# Patient Record
Sex: FEMALE | Race: OTHER | Employment: FULL TIME | URBAN - METROPOLITAN AREA
[De-identification: names, ages, dates, MRNs, and addresses within clinical notes are randomized per-mention and may not be internally consistent; named-entity substitution may affect disease eponyms.]

---

## 2017-01-17 ENCOUNTER — OFFICE VISIT (OUTPATIENT)
Dept: GASTROENTEROLOGY | Facility: CLINIC | Age: 52
End: 2017-01-17

## 2017-01-17 ENCOUNTER — TELEPHONE (OUTPATIENT)
Dept: GASTROENTEROLOGY | Facility: CLINIC | Age: 52
End: 2017-01-17

## 2017-01-17 VITALS
HEART RATE: 98 BPM | SYSTOLIC BLOOD PRESSURE: 106 MMHG | BODY MASS INDEX: 27.5 KG/M2 | HEIGHT: 61 IN | DIASTOLIC BLOOD PRESSURE: 69 MMHG | WEIGHT: 145.63 LBS

## 2017-01-17 DIAGNOSIS — Z12.12 ENCOUNTER FOR COLORECTAL CANCER SCREENING: Primary | ICD-10-CM

## 2017-01-17 DIAGNOSIS — Z12.11 ENCOUNTER FOR COLORECTAL CANCER SCREENING: Primary | ICD-10-CM

## 2017-01-17 PROCEDURE — 99243 OFF/OP CNSLTJ NEW/EST LOW 30: CPT | Performed by: INTERNAL MEDICINE

## 2017-01-17 PROCEDURE — 99212 OFFICE O/P EST SF 10 MIN: CPT | Performed by: INTERNAL MEDICINE

## 2017-01-17 NOTE — TELEPHONE ENCOUNTER
Scheduled for:  Colonoscopy 64148  Date:  2/9/17  Location:  [facility]  Sedation:  IV  Time:  0830  Prep:  Miaralax/Gatorade  Meds/Allergies Reconciled?:  Yes  Diagnosis with codes:  Colon cancer screening Z12.11  EM or [facility] notified?:  I notified Etienne Lentz in Endo

## 2017-01-17 NOTE — H&P
History of present illness: This is a delightful 42-year-old female referred by Dr. Meseret Grijalva for a colorectal cancer screening evaluation. The patient has never undergone colonoscopy or other forms of screening.   She denies symptoms including rectal b

## 2017-01-17 NOTE — PROGRESS NOTES
HPI:    Patient ID: Nav Aggarwal is a 49year old female. HPI    Review of Systems   Constitutional: Negative for fever, chills, diaphoresis, activity change, appetite change, fatigue and unexpected weight change.    HENT: Negative for congest There is no CVA tenderness. No hernia. Hernia confirmed negative in the ventral area. Abdomen soft, nondistended, nontender. No masses or hepatosplenomegaly. Lymphadenopathy:     She has no cervical adenopathy. Skin: She is not diaphoretic.    Psychi

## 2017-02-09 ENCOUNTER — HOSPITAL ENCOUNTER (OUTPATIENT)
Facility: HOSPITAL | Age: 52
Setting detail: HOSPITAL OUTPATIENT SURGERY
Discharge: HOME | End: 2017-02-09
Attending: INTERNAL MEDICINE | Admitting: INTERNAL MEDICINE
Payer: COMMERCIAL

## 2017-02-09 ENCOUNTER — SURGERY (OUTPATIENT)
Age: 52
End: 2017-02-09

## 2017-02-09 PROBLEM — K64.8 INTERNAL HEMORRHOIDS: Status: ACTIVE | Noted: 2017-02-09

## 2017-02-09 PROCEDURE — 0DJD8ZZ INSPECTION OF LOWER INTESTINAL TRACT, VIA NATURAL OR ARTIFICIAL OPENING ENDOSCOPIC: ICD-10-PCS | Performed by: INTERNAL MEDICINE

## 2017-02-09 PROCEDURE — 45378 DIAGNOSTIC COLONOSCOPY: CPT | Performed by: INTERNAL MEDICINE

## 2017-02-09 PROCEDURE — 99152 MOD SED SAME PHYS/QHP 5/>YRS: CPT | Performed by: INTERNAL MEDICINE

## 2017-02-09 RX ORDER — SODIUM CHLORIDE 0.9 % (FLUSH) 0.9 %
10 SYRINGE (ML) INJECTION AS NEEDED
Status: DISCONTINUED | OUTPATIENT
Start: 2017-02-09 | End: 2017-02-09

## 2017-02-09 RX ORDER — MIDAZOLAM HYDROCHLORIDE 1 MG/ML
INJECTION INTRAMUSCULAR; INTRAVENOUS
Status: DISCONTINUED | OUTPATIENT
Start: 2017-02-09 | End: 2017-02-09

## 2017-02-09 RX ORDER — MIDAZOLAM HYDROCHLORIDE 1 MG/ML
1 INJECTION INTRAMUSCULAR; INTRAVENOUS EVERY 5 MIN PRN
Status: DISCONTINUED | OUTPATIENT
Start: 2017-02-09 | End: 2017-02-09

## 2017-02-09 RX ORDER — SODIUM CHLORIDE, SODIUM LACTATE, POTASSIUM CHLORIDE, CALCIUM CHLORIDE 600; 310; 30; 20 MG/100ML; MG/100ML; MG/100ML; MG/100ML
INJECTION, SOLUTION INTRAVENOUS CONTINUOUS
Status: DISCONTINUED | OUTPATIENT
Start: 2017-02-09 | End: 2017-02-09

## 2017-02-09 NOTE — H&P
History & Physical Examination    Patient Name: Ace Kaye  MRN: [id number]  [facility]: [id number]  YOB: 1965    Diagnosis: colorectal screening      No prescriptions prior to admission    Current Facility-Administered Medications:  Mid

## 2017-02-09 NOTE — BRIEF OP NOTE
[facility] ENDOSCOPY LAB SUITES  Brief Op Note     Bessie Holt Location: OR   [facility] [id number] MRN [id number]   Admission Date 2/9/2017 Operation Date 2/9/2017   Attending Physician Modesta Choe* Operating Physician Akiko Diss

## 2017-02-09 NOTE — OPERATIVE REPORT
[facility]    PATIENT'S NAME: Christiansen Conrad   ATTENDING PHYSICIAN: Herlinda Jerome MD   OPERATING PHYSICIAN: Herlinda Jerome MD   PATIENT ACCOUNT#:   [de-identified]    LOCATION:  [facility] ENDO [place] ROOM [address] [place] [address] tags.    RECOMMENDATION:  Next screening colonoscopy in 10 years unless other signs or symptoms develop in the interim.     Dictated By Bonnie Mcintosh MD  d: 02/09/2017 93:36:06  t: 02/09/2017 10:14:24  [facility] [id number]  [facility]/

## 2017-02-10 VITALS
RESPIRATION RATE: 14 BRPM | HEIGHT: 61 IN | DIASTOLIC BLOOD PRESSURE: 54 MMHG | HEART RATE: 76 BPM | BODY MASS INDEX: 25.86 KG/M2 | SYSTOLIC BLOOD PRESSURE: 100 MMHG | WEIGHT: 137 LBS | OXYGEN SATURATION: 99 %

## 2017-02-13 ENCOUNTER — TELEPHONE (OUTPATIENT)
Dept: GASTROENTEROLOGY | Facility: CLINIC | Age: 52
End: 2017-02-13

## 2021-10-06 ENCOUNTER — LAB ENCOUNTER (OUTPATIENT)
Dept: LAB | Age: 56
End: 2021-10-06
Attending: INTERNAL MEDICINE
Payer: COMMERCIAL

## 2021-10-06 ENCOUNTER — OFFICE VISIT (OUTPATIENT)
Dept: INTERNAL MEDICINE CLINIC | Facility: CLINIC | Age: 56
End: 2021-10-06
Payer: COMMERCIAL

## 2021-10-06 ENCOUNTER — TELEPHONE (OUTPATIENT)
Dept: INTERNAL MEDICINE CLINIC | Facility: CLINIC | Age: 56
End: 2021-10-06

## 2021-10-06 VITALS
HEART RATE: 80 BPM | WEIGHT: 144 LBS | SYSTOLIC BLOOD PRESSURE: 114 MMHG | BODY MASS INDEX: 27.19 KG/M2 | TEMPERATURE: 98 F | HEIGHT: 61 IN | DIASTOLIC BLOOD PRESSURE: 72 MMHG

## 2021-10-06 DIAGNOSIS — E11.9 TYPE 2 DIABETES MELLITUS WITHOUT COMPLICATION, WITHOUT LONG-TERM CURRENT USE OF INSULIN (HCC): ICD-10-CM

## 2021-10-06 DIAGNOSIS — Z76.89 ENCOUNTER TO ESTABLISH CARE: Primary | ICD-10-CM

## 2021-10-06 DIAGNOSIS — Z00.00 ANNUAL PHYSICAL EXAM: ICD-10-CM

## 2021-10-06 DIAGNOSIS — E78.00 HYPERCHOLESTEROLEMIA: ICD-10-CM

## 2021-10-06 DIAGNOSIS — R94.31 ABNORMAL EKG: ICD-10-CM

## 2021-10-06 DIAGNOSIS — Z01.812 PRE-PROCEDURE LAB EXAM: ICD-10-CM

## 2021-10-06 PROCEDURE — 3074F SYST BP LT 130 MM HG: CPT | Performed by: INTERNAL MEDICINE

## 2021-10-06 PROCEDURE — 80053 COMPREHEN METABOLIC PANEL: CPT

## 2021-10-06 PROCEDURE — 99386 PREV VISIT NEW AGE 40-64: CPT | Performed by: INTERNAL MEDICINE

## 2021-10-06 PROCEDURE — 82043 UR ALBUMIN QUANTITATIVE: CPT

## 2021-10-06 PROCEDURE — 93000 ELECTROCARDIOGRAM COMPLETE: CPT | Performed by: INTERNAL MEDICINE

## 2021-10-06 PROCEDURE — 3008F BODY MASS INDEX DOCD: CPT | Performed by: INTERNAL MEDICINE

## 2021-10-06 PROCEDURE — 3078F DIAST BP <80 MM HG: CPT | Performed by: INTERNAL MEDICINE

## 2021-10-06 PROCEDURE — 83036 HEMOGLOBIN GLYCOSYLATED A1C: CPT

## 2021-10-06 PROCEDURE — 85025 COMPLETE CBC W/AUTO DIFF WBC: CPT

## 2021-10-06 PROCEDURE — 36415 COLL VENOUS BLD VENIPUNCTURE: CPT

## 2021-10-06 PROCEDURE — 82570 ASSAY OF URINE CREATININE: CPT

## 2021-10-06 RX ORDER — ASPIRIN 81 MG/1
TABLET ORAL
COMMUNITY
End: 2021-10-06

## 2021-10-06 RX ORDER — ATORVASTATIN CALCIUM 10 MG/1
10 TABLET, FILM COATED ORAL DAILY
COMMUNITY
Start: 2021-07-08

## 2021-10-06 RX ORDER — ASPIRIN 81 MG/1
81 TABLET ORAL DAILY
Refills: 0 | COMMUNITY
Start: 2021-10-06

## 2021-10-06 NOTE — PATIENT INSTRUCTIONS
Mammogram due after 11/10/21. Shingles vaccine is available at pharmacy. Get a flu shot at your work. CT heart calcium score screening test.   Stress echo recommended for abnormal EKG.

## 2021-10-06 NOTE — PROGRESS NOTES
Lola Hubbard is a 48year old female who presents for     Reestablish care–last seen 10/28/16. In the interim saw a doctor at [address] [facility]--Dr Phoebe Angel. Decided to come back here. Annual physical.    Feels good.      Since last seen here, diagn constipation  Genitourinary:  No dysuria or blood in the urine  Dermatologic:  No rash or itching.       EXAM:   /72   Pulse 80   Temp 98.1 °F (36.7 °C) (Oral)   Ht 5' 1\" (1.549 m)   Wt 144 lb (65.3 kg)   BMI 27.21 kg/m²     Wt Readings from Last 6 E given at 10/6/21 visit to do at [facility] HOSP AT [facility]. Gyn-pap w HPV neg 11/17/16--Dr Maria Eugenia Cruz. Also per pt had pap June 2020 at PCP Dr Ti Cohen. Colonoscopy 2/9/17–Dr De Los Santos-normal, small int.hemorrhoids.   At 10/6/21 visit, I recom CT heart calcium score screening test.

## 2021-10-06 NOTE — TELEPHONE ENCOUNTER
To nursing, please tell patient personally   lab results done today are good. A1c is 6.4 which indicates overall good sugar control. Please also tell her I read her EKG after she left the office today.   There are some abnormalities that may or may not

## 2022-02-26 ENCOUNTER — LAB ENCOUNTER (OUTPATIENT)
Dept: LAB | Age: 57
End: 2022-02-26
Attending: INTERNAL MEDICINE
Payer: COMMERCIAL

## 2022-02-26 DIAGNOSIS — Z01.812 PRE-PROCEDURE LAB EXAM: ICD-10-CM

## 2022-02-27 LAB — SARS-COV-2 RNA RESP QL NAA+PROBE: NOT DETECTED

## 2022-02-28 ENCOUNTER — TELEPHONE (OUTPATIENT)
Dept: INTERNAL MEDICINE CLINIC | Facility: CLINIC | Age: 57
End: 2022-02-28

## 2022-03-01 ENCOUNTER — TELEPHONE (OUTPATIENT)
Dept: INTERNAL MEDICINE CLINIC | Facility: CLINIC | Age: 57
End: 2022-03-01

## 2022-03-01 NOTE — TELEPHONE ENCOUNTER
I called and spoke to [address] [place] [address] Diagnostics--Jaelyn. Patient exercised for about 2 minutes on the treadmill for stress echo today and when heart rate 110, she changed to left bundle branch block. Stress echo was stopped. Order changed to [facility] stress test as recommended. Discussed insurance will need to approve. 1. LBBB (left bundle branch block)  - CARD NUC STRESS TEST LEXISCAN (CPT=93015/20270/); Future    2. Abnormal EKG  - CARD NUC STRESS TEST LEXISCAN (CPT=93015/21548/);  Future

## 2022-03-01 NOTE — TELEPHONE ENCOUNTER
To nursing, please tell patient I spoke to [place] at [facility] [place] [facility] cardiac diagnostics and she said due to her heart rhythm pattern today, we needed to change the type of stress test to a resting stress test called Lexiscan stress test.  Tell her I entered this order. She should check with her insurance to be sure they authorize this. Thanks.

## 2022-03-01 NOTE — TELEPHONE ENCOUNTER
Ron Collins from [facility] is calling and requesting orders put in for a     [address] [place] [address]    Patient has a left bundle branch block  (LBB)

## 2022-03-10 ENCOUNTER — HOSPITAL ENCOUNTER (OUTPATIENT)
Dept: NUCLEAR MEDICINE | Facility: HOSPITAL | Age: 57
Discharge: HOME | End: 2022-03-10
Attending: INTERNAL MEDICINE
Payer: COMMERCIAL

## 2022-03-10 ENCOUNTER — HOSPITAL ENCOUNTER (OUTPATIENT)
Dept: CV DIAGNOSTICS | Facility: HOSPITAL | Age: 57
Discharge: HOME | End: 2022-03-10
Attending: INTERNAL MEDICINE
Payer: COMMERCIAL

## 2022-03-10 ENCOUNTER — TELEPHONE (OUTPATIENT)
Dept: INTERNAL MEDICINE CLINIC | Facility: CLINIC | Age: 57
End: 2022-03-10

## 2022-03-10 DIAGNOSIS — R94.31 ABNORMAL EKG: ICD-10-CM

## 2022-03-10 DIAGNOSIS — I44.7 LBBB (LEFT BUNDLE BRANCH BLOCK): ICD-10-CM

## 2022-03-10 PROCEDURE — 78452 HT MUSCLE IMAGE SPECT MULT: CPT | Performed by: INTERNAL MEDICINE

## 2022-03-10 PROCEDURE — 93018 CV STRESS TEST I&R ONLY: CPT | Performed by: INTERNAL MEDICINE

## 2022-03-10 PROCEDURE — 93017 CV STRESS TEST TRACING ONLY: CPT | Performed by: INTERNAL MEDICINE

## 2022-03-10 PROCEDURE — 93016 CV STRESS TEST SUPVJ ONLY: CPT | Performed by: INTERNAL MEDICINE

## 2022-03-10 NOTE — TELEPHONE ENCOUNTER
called regarding nuclear stress test results results for the patient. Per , it shows \"small, moderately reversible perfusion defect which may constitute evidence of stress induced ischemia\". Report in Epic.  To  (on call) as well as Arin Farrell

## 2022-03-10 NOTE — TELEPHONE ENCOUNTER
LMTCB on cell phone   (LM I'm not in the office this week but call for test results from an office staff member.)  When pt calls back, tell her the stress test today is abnormal and she needs to see a cardiologist. Does she have one in mind? If not, will need to expedite cardiology appt for her. Dr Lopez Police, etc.  If any symptoms such as chest pain or shortness of breath, she needs to go to ER. Thanks.

## 2022-03-11 NOTE — TELEPHONE ENCOUNTER
Called patient  and relayed DR. ARIAS message - verbalized understanding Information for DR. Yael Montalvo given to patient

## 2022-03-13 NOTE — TELEPHONE ENCOUNTER
To nursing, please call pt--tell her Dr Juice Tate told me she wants a female cardiologist.  Does she have one in mind that she wants to see? She should be aware there is no female cardiologist at [facility] and the one at [facility]. [facility] Gisela and [employer] do not do angiograms. Would like to expedite an appt due to concerning stress test results. Please let me know. Thanks. Note to self-  I spoke to Dr Juice Tate on Friday 3/11/22. He would arrange an expedited appt for pt.    He contacted me later and said pt wanted instead a female cardiologist.

## 2022-03-14 NOTE — TELEPHONE ENCOUNTER
Ollie Collet called from [facility] Cardiovascular  They can squeeze patient in if needed  Dr Diogo Rangel can double book   (Pt might be looking for female cardiologist  GERRI Vasquez Pt can try Dr Sadaf Badillo, she is female, doesn't do cath.   [phone])  Please call back to schedule with Dr Diogo Rangel if patient would like   Tasked to nursing

## 2022-03-14 NOTE — TELEPHONE ENCOUNTER
Called and relayed MDs message-verbalized understanding. Patient is going to try call [facility] to determine if they have a female cardiologist that does angiograms. She will call back with an update. Advised to call back with full name, address, phone & fax # for documentation purposes.    Patient made aware that MD would like process expedite due to result of stress test.

## 2022-03-14 NOTE — TELEPHONE ENCOUNTER
Left message for Soren Munguia at Dr Otilio Abel office. VM left for Soren Munguia to proceed with scheduling patient with Dr. Severo Bores ASAP. (since Dr. Allie Perkins doesn't do cath). Advised to call patient to schedule. Instructed to call our office back if she has any questions.

## 2022-03-14 NOTE — TELEPHONE ENCOUNTER
LMTCB on backline for [address] [place] [address] Avenue to verify MD message below if they were reaching out to pt to get her in expedited with Dr. Marilee Stock.

## 2022-03-14 NOTE — TELEPHONE ENCOUNTER
To nursing, pls call Dr Fredi Champion office and tell them I spoke to him Friday 3/11/22 and he said his office could contact pt to expedite an appt for pt. Can they pls reach out to pt and let Dr Diogo Rangel know? Thanks.

## 2022-03-14 NOTE — TELEPHONE ENCOUNTER
Patient called back and stated that she will see Dr. Anahy Cristobal. Contact information provided. Advised to call and schedule appt asap.

## 2022-03-24 VITALS — HEIGHT: 62 IN | BODY MASS INDEX: 26.5 KG/M2 | WEIGHT: 144 LBS

## 2022-03-24 RX ORDER — SODIUM CHLORIDE 9 MG/ML
INJECTION, SOLUTION INTRAVENOUS
OUTPATIENT
Start: 2022-03-25 | End: 2022-03-25

## 2022-03-26 ENCOUNTER — NURSE ONLY (OUTPATIENT)
Dept: LAB | Facility: HOSPITAL | Age: 57
End: 2022-03-26
Attending: INTERNAL MEDICINE
Payer: COMMERCIAL

## 2022-03-26 DIAGNOSIS — E11.9 TYPE 2 DIABETES MELLITUS WITHOUT COMPLICATION, WITHOUT LONG-TERM CURRENT USE OF INSULIN (HCC): ICD-10-CM

## 2022-03-26 DIAGNOSIS — Z01.818 PRE-OP TESTING: ICD-10-CM

## 2022-03-26 LAB
ALBUMIN SERPL-MCNC: 3.5 G/DL (ref 3.4–5)
ALBUMIN/GLOB SERPL: 1 {RATIO} (ref 1–2)
ALP LIVER SERPL-CCNC: 102 U/L
ALT SERPL-CCNC: 20 U/L
ANION GAP SERPL CALC-SCNC: 5 MMOL/L (ref 0–18)
AST SERPL-CCNC: 16 U/L (ref 15–37)
BILIRUB SERPL-MCNC: 0.5 MG/DL (ref 0.1–2)
BUN BLD-MCNC: 10 MG/DL (ref 7–18)
BUN/CREAT SERPL: 13.7 (ref 10–20)
CALCIUM BLD-MCNC: 9 MG/DL (ref 8.5–10.1)
CHLORIDE SERPL-SCNC: 107 MMOL/L (ref 98–112)
CHOLEST SERPL-MCNC: 137 MG/DL (ref ?–200)
CO2 SERPL-SCNC: 28 MMOL/L (ref 21–32)
CREAT BLD-MCNC: 0.73 MG/DL
DEPRECATED RDW RBC AUTO: 38 FL (ref 35.1–46.3)
ERYTHROCYTE [DISTWIDTH] IN BLOOD BY AUTOMATED COUNT: 12.5 % (ref 11–15)
EST. AVERAGE GLUCOSE BLD GHB EST-MCNC: 134 MG/DL (ref 68–126)
FASTING PATIENT LIPID ANSWER: YES
FASTING STATUS PATIENT QL REPORTED: YES
GLOBULIN PLAS-MCNC: 3.6 G/DL (ref 2.8–4.4)
GLUCOSE BLD-MCNC: 147 MG/DL (ref 70–99)
HBA1C MFR BLD: 6.3 % (ref ?–5.7)
HCT VFR BLD AUTO: 37.7 %
HDLC SERPL-MCNC: 46 MG/DL (ref 40–59)
HGB BLD-MCNC: 12.9 G/DL
INR BLD: 1.04 (ref 0.8–1.2)
LDLC SERPL CALC-MCNC: 73 MG/DL (ref ?–100)
MCH RBC QN AUTO: 29.1 PG (ref 26–34)
MCHC RBC AUTO-ENTMCNC: 34.2 G/DL (ref 31–37)
MCV RBC AUTO: 84.9 FL
NONHDLC SERPL-MCNC: 91 MG/DL (ref ?–130)
OSMOLALITY SERPL CALC.SUM OF ELEC: 292 MOSM/KG (ref 275–295)
PLATELET # BLD AUTO: 275 10(3)UL (ref 150–450)
POTASSIUM SERPL-SCNC: 4.1 MMOL/L (ref 3.5–5.1)
PROT SERPL-MCNC: 7.1 G/DL (ref 6.4–8.2)
PROTHROMBIN TIME: 13.7 SECONDS (ref 11.6–14.8)
RBC # BLD AUTO: 4.44 X10(6)UL
SODIUM SERPL-SCNC: 140 MMOL/L (ref 136–145)
TRIGL SERPL-MCNC: 94 MG/DL (ref 30–149)
TSI SER-ACNC: 2.93 MIU/ML (ref 0.36–3.74)
VLDLC SERPL CALC-MCNC: 14 MG/DL (ref 0–30)
WBC # BLD AUTO: 9.3 X10(3) UL (ref 4–11)

## 2022-03-26 PROCEDURE — 85610 PROTHROMBIN TIME: CPT

## 2022-03-26 PROCEDURE — 84443 ASSAY THYROID STIM HORMONE: CPT

## 2022-03-26 PROCEDURE — 80053 COMPREHEN METABOLIC PANEL: CPT

## 2022-03-26 PROCEDURE — 83036 HEMOGLOBIN GLYCOSYLATED A1C: CPT

## 2022-03-26 PROCEDURE — 3044F HG A1C LEVEL LT 7.0%: CPT | Performed by: INTERNAL MEDICINE

## 2022-03-26 PROCEDURE — 85027 COMPLETE CBC AUTOMATED: CPT

## 2022-03-26 PROCEDURE — 36415 COLL VENOUS BLD VENIPUNCTURE: CPT

## 2022-03-26 PROCEDURE — 80061 LIPID PANEL: CPT

## 2022-03-28 ENCOUNTER — TELEPHONE (OUTPATIENT)
Dept: INTERNAL MEDICINE CLINIC | Facility: CLINIC | Age: 57
End: 2022-03-28

## 2022-03-29 ENCOUNTER — HOSPITAL ENCOUNTER (OUTPATIENT)
Dept: INTERVENTIONAL RADIOLOGY/VASCULAR | Facility: HOSPITAL | Age: 57
Discharge: HOME | End: 2022-03-29
Attending: INTERNAL MEDICINE
Payer: COMMERCIAL

## 2022-03-29 DIAGNOSIS — Z01.818 PRE-OP TESTING: Primary | ICD-10-CM

## 2022-04-05 ENCOUNTER — OFFICE VISIT (OUTPATIENT)
Dept: INTERNAL MEDICINE CLINIC | Facility: CLINIC | Age: 57
End: 2022-04-05
Payer: COMMERCIAL

## 2022-04-05 VITALS
RESPIRATION RATE: 12 BRPM | DIASTOLIC BLOOD PRESSURE: 68 MMHG | SYSTOLIC BLOOD PRESSURE: 112 MMHG | WEIGHT: 140 LBS | BODY MASS INDEX: 25.76 KG/M2 | TEMPERATURE: 98 F | HEIGHT: 62 IN | OXYGEN SATURATION: 98 % | HEART RATE: 71 BPM

## 2022-04-05 DIAGNOSIS — R94.39 ABNORMAL STRESS TEST: ICD-10-CM

## 2022-04-05 DIAGNOSIS — E78.00 HYPERCHOLESTEROLEMIA: ICD-10-CM

## 2022-04-05 DIAGNOSIS — E11.9 TYPE 2 DIABETES MELLITUS WITHOUT COMPLICATION, WITHOUT LONG-TERM CURRENT USE OF INSULIN (HCC): Primary | ICD-10-CM

## 2022-04-05 PROCEDURE — 3074F SYST BP LT 130 MM HG: CPT | Performed by: INTERNAL MEDICINE

## 2022-04-05 PROCEDURE — 3008F BODY MASS INDEX DOCD: CPT | Performed by: INTERNAL MEDICINE

## 2022-04-05 PROCEDURE — 3078F DIAST BP <80 MM HG: CPT | Performed by: INTERNAL MEDICINE

## 2022-04-05 PROCEDURE — 99214 OFFICE O/P EST MOD 30 MIN: CPT | Performed by: INTERNAL MEDICINE

## 2022-04-05 RX ORDER — ATORVASTATIN CALCIUM 10 MG/1
10 TABLET, FILM COATED ORAL DAILY
Qty: 90 TABLET | Refills: 3 | Status: SHIPPED | OUTPATIENT
Start: 2022-04-05

## 2022-04-05 RX ORDER — ASPIRIN 81 MG/1
81 TABLET ORAL DAILY
Qty: 90 TABLET | Refills: 3 | Status: SHIPPED | OUTPATIENT
Start: 2022-04-05

## 2022-04-12 ENCOUNTER — TELEPHONE (OUTPATIENT)
Dept: INTERNAL MEDICINE CLINIC | Facility: CLINIC | Age: 57
End: 2022-04-12

## 2022-04-12 NOTE — TELEPHONE ENCOUNTER
Nursing,     Patient confirmed she is taking 50mg of Januvia. Did confirm on , initially dispensed in error on 4/6/22 at 25mg. Pended RX for Nursing to address (please note, out of MA scope of practice).

## 2022-04-12 NOTE — TELEPHONE ENCOUNTER
Patient calling verifying correct dosage of medication. Was taking Sitagliptin (Januvia) 50mg taking 1 tablet a day. Noticed on her AVS Sitagliptin (Januvia) 25mg. Asking if Dr. Marcia Auguste has changed the dosage. Will wait to  prescription.       Best call back number [phone]

## 2022-04-12 NOTE — TELEPHONE ENCOUNTER
Spoke to patient and confirmed she does need refill and that she is taking 50 mg januvia, [facility]

## 2022-04-12 NOTE — TELEPHONE ENCOUNTER
To nursing. When she returned to this practice (re-established care) in Oct 2021, we entered dose of januvia 25 mg daily as the dose she was on. Perhaps this dose was entered in error and she actually was taking 50 mg daily. If that is the case, then change in med list dose to januvia 50 mg daily and can send new refill #90 with 3 RFs if needed. Thanks.

## 2023-04-10 ENCOUNTER — TELEPHONE (OUTPATIENT)
Dept: INTERNAL MEDICINE CLINIC | Facility: CLINIC | Age: 58
End: 2023-04-10

## 2023-04-10 RX ORDER — ATORVASTATIN CALCIUM 10 MG/1
10 TABLET, FILM COATED ORAL DAILY
Qty: 90 TABLET | Refills: 3 | Status: CANCELLED | OUTPATIENT
Start: 2023-04-10

## 2023-04-10 NOTE — TELEPHONE ENCOUNTER
TO -- patient last seen for a check up 4/5/22. Please call patient to schedule with associate and then route back to clinical for refill.  Thanks! (pt did not read correction my chart blast)

## (undated) DIAGNOSIS — E11.9 TYPE 2 DIABETES MELLITUS WITHOUT COMPLICATION, WITHOUT LONG-TERM CURRENT USE OF INSULIN (HCC): ICD-10-CM

## (undated) DIAGNOSIS — I44.7 LBBB (LEFT BUNDLE BRANCH BLOCK): Primary | ICD-10-CM

## (undated) DIAGNOSIS — E11.9 TYPE 2 DIABETES MELLITUS WITHOUT COMPLICATION, WITHOUT LONG-TERM CURRENT USE OF INSULIN (HCC): Primary | ICD-10-CM

## (undated) DIAGNOSIS — R94.31 ABNORMAL EKG: ICD-10-CM

## (undated) DEVICE — ENDOSCOPY PACK - LOWER: Brand: MEDLINE INDUSTRIES, INC.

## (undated) DEVICE — Device: Brand: DEFENDO AIR/WATER/SUCTION AND BIOPSY VALVE

## (undated) NOTE — LETTER
10/6/2021      Jane Angel ,  67. This paper serves as order for:  Mammogram bilateral screening. Dx Z12.31     Please fax results to my office at 21 [phone].         Sincerely,    Kartik Barr MD          Document generated a

## (undated) NOTE — MR AVS SNAPSHOT
[facility]3]  [address] [place] [id number] [phone]               Thank you for choosing us for your health care visit with Kelle Gary MD.  We are glad to serve you and happy to provide you with this summary of yo

## (undated) NOTE — IP AVS SNAPSHOT
[facility] HOSP - [facility]    [address], [place], [place] ~ [phone]                Discharge Summary   2/9/2017    [facility]           Admission Information        Provider Department    2/9/2017 [facility] 4. Follow-up: Any biopsies taken will have results available in 3 working days. We do not discuss any results of the procedure with the patient the day of the test because you will not remember.  Whoever was waiting for you in the waiting room will know mi tissue) for a day or two after the exam. This is normal.  - If you experience any rectal bleeding (not spotting), persistent tenderness or sharp severe abdominal pains, oral temperature over 100 degrees Fahrenheit, light-headedness or dizziness, or any oth You can access your MyChart to more actively manage your health care and view more details from this visit by going to [email or web address].   If you've recently had a stay at the Hospital you can access your discharge instructions in [address] by mariaelena